# Patient Record
Sex: MALE | Race: WHITE
[De-identification: names, ages, dates, MRNs, and addresses within clinical notes are randomized per-mention and may not be internally consistent; named-entity substitution may affect disease eponyms.]

---

## 2017-08-05 ENCOUNTER — HOSPITAL ENCOUNTER (EMERGENCY)
Dept: HOSPITAL 25 - UCCORT | Age: 77
Discharge: HOME | End: 2017-08-05
Payer: MEDICARE

## 2017-08-05 VITALS — SYSTOLIC BLOOD PRESSURE: 124 MMHG | DIASTOLIC BLOOD PRESSURE: 55 MMHG

## 2017-08-05 DIAGNOSIS — Z87.891: ICD-10-CM

## 2017-08-05 DIAGNOSIS — I10: ICD-10-CM

## 2017-08-05 DIAGNOSIS — J06.9: Primary | ICD-10-CM

## 2017-08-05 DIAGNOSIS — H61.20: ICD-10-CM

## 2017-08-05 PROCEDURE — 99213 OFFICE O/P EST LOW 20 MIN: CPT

## 2017-08-05 PROCEDURE — G0463 HOSPITAL OUTPT CLINIC VISIT: HCPCS

## 2017-08-05 PROCEDURE — 87651 STREP A DNA AMP PROBE: CPT

## 2017-08-05 NOTE — UC
Throat Pain/Nasal Cy HPI





- HPI Summary


HPI Summary: 





3 days of Uri congestion cough and sore throat, was with granddaughter last 

week who had strep, pt has cough and sore throat





- History of Current Complaint


Chief Complaint: UCRespiratory


Stated Complaint: COUGH/CONGEST/SORE THROAT


Time Seen by Provider: 08/05/17 17:53


Hx Obtained From: Patient


Onset/Duration: Sudden Onset, Lasting Days - 3, Worse Since - today


Severity: Moderate


Pain Intensity: 3


Pain Scale Used: 0-10 Numeric


Cough: Nonproductive


Associated Signs & Symptoms: Positive: Wheezing - this morning.  Negative: FB 

Sensation, Drooling, Hoarseness, Sinus Discomfort, Nasal Discharge, Fever





- Allergies/Home Medications


Allergies/Adverse Reactions: 


 Allergies











Allergy/AdvReac Type Severity Reaction Status Date / Time


 


No Known Allergies Allergy   Verified 08/05/17 17:42











Home Medications: 


 Home Medications





2 Other Meds For Bp  DAILY 08/05/17 [History]


Omeprazole CAP* [Prilosec CAP* 20 MG] 20 mg PO DAILY 08/05/17 [History 

Confirmed 08/05/17]


Telmisartan 40 mg PO DAILY 08/05/17 [History Confirmed 08/05/17]











PMH/Surg Hx/FS Hx/Imm Hx


Previously Healthy: No


Cardiovascular History: Hypertension





- Surgical History


Surgical History: Yes


Surgery Procedure, Year, and Place: DETATCHED RETINA WITH SCLERAL BUCKLE  (

RETINA VITRIOUS SPECIALISTIS IN [place] DR ZELAYA//OR REPORT SCANNED INTO 

[email or web address] FACILITIES REPORTS),lower back





- Family History


Known Family History: Positive: None





- Social History


Occupation: Retired


Lives: With Family


Alcohol Use: Daily


Alcohol Amount: beer daily


Substance Use Type: None


Smoking Status (MU): Former Smoker


Type: Cigarettes


Amount Used/How Often: can not recall


Length of Time of Smoking/Using Tobacco: can not recall





Review of Systems


Constitutional: Negative


Skin: Negative


Eyes: Negative


ENT: Sore Throat


Respiratory: Cough


Cardiovascular: Negative


Gastrointestinal: Negative


Genitourinary: Negative


Motor: Negative


Neurovascular: Negative


Musculoskeletal: Negative


Neurological: Negative


Psychological: Negative


All Other Systems Reviewed And Are Negative: Yes





Physical Exam


Triage Information Reviewed: Yes


Appearance: Well-Appearing, No Pain Distress, Well-Nourished


Vital Signs: 


 Initial Vital Signs











Temp  99.5 F   08/05/17 17:35


 


Pulse  87   08/05/17 17:35


 


Resp  16   08/05/17 17:35


 


BP  124/55   08/05/17 17:35


 


Pulse Ox  99   08/05/17 17:35











Vital Signs Reviewed: Yes


Eye Exam: Normal


Eyes: Positive: Conjunctiva Clear


ENT Exam: Normal


ENT: Positive: Normal ENT inspection, Hearing grossly normal, Other: - cerumen 

impaction---WNL after ear irragation.  Negative: Nasal congestion, Nasal 

drainage, Tonsillar swelling, Tonsillar exudate, Trismus, Muffled/hoarse voice


Dental Exam: Normal


Neck exam: Normal


Neck: Positive: Supple, Nontender, No Lymphadenopathy


Respiratory Exam: Normal


Respiratory: Positive: Chest non-tender, Lungs clear, Normal breath sounds, No 

respiratory distress, No accessory muscle use


Cardiovascular Exam: Normal


Cardiovascular: Positive: RRR, No Murmur, Pulses Normal, Brisk Capillary Refill


Musculoskeletal Exam: Normal


Musculoskeletal: Positive: Strength Intact, ROM Intact, No Edema


Neurological Exam: Normal


Neurological: Positive: Alert, Muscle Tone Normal


Psychological Exam: Normal


Skin Exam: Normal





Diagnostics





- Laboratory


Diagnostic Studies Completed/Ordered: RST (-)





Throat Pain/Nasal Course/Dx





- Course


Course Of Treatment: Robitussin, Ibuprofen, tylenol, increase fluids, follow 

with pcp prn





- Differential Dx/Diagnosis


Differential Diagnosis/HQI/PQRI: Influenza, Otitis Media, Pharyngitis, Sinusitis

, URI


Provider Diagnoses: Uri, cerumen impaction





Discharge





- Discharge Plan


Condition: Stable


Disposition: HOME


Patient Education Materials:  Antitussives (By mouth), Cerumen Impaction (ED), 

Viral Syndrome (ED)


Referrals: 


Bobby Smith MD [Primary Care Provider] - 1 Week

## 2018-08-19 ENCOUNTER — HOSPITAL ENCOUNTER (EMERGENCY)
Dept: HOSPITAL 25 - UCCORT | Age: 78
Discharge: HOME | End: 2018-08-19
Payer: MEDICARE

## 2018-08-19 VITALS — SYSTOLIC BLOOD PRESSURE: 99 MMHG | DIASTOLIC BLOOD PRESSURE: 50 MMHG

## 2018-08-19 DIAGNOSIS — R50.9: Primary | ICD-10-CM

## 2018-08-19 DIAGNOSIS — Z87.891: ICD-10-CM

## 2018-08-19 PROCEDURE — 99212 OFFICE O/P EST SF 10 MIN: CPT

## 2018-08-19 PROCEDURE — G0463 HOSPITAL OUTPT CLINIC VISIT: HCPCS

## 2018-08-19 PROCEDURE — 71046 X-RAY EXAM CHEST 2 VIEWS: CPT

## 2018-08-19 NOTE — RAD
HISTORY: fever, exposure to pneumonia



COMPARISONS: None



VIEWS: 4: Frontal dual-energy and lateral views of the chest.



FINDINGS:

CARDIOMEDIASTINAL SILHOUETTE: The cardiomediastinal silhouette is normal.

TANIA: The tania are normal.

PLEURA: There is blunting of left costophrenic angle.

LUNG PARENCHYMA: The lungs are clear.

ABDOMEN: The upper abdomen is clear. There is no subphrenic gas.

BONES AND SOFT TISSUES: Degenerative changes are noted along the spine.

OTHER: None.



IMPRESSION:

SMALL LEFT PLEURAL EFFUSION NO ACTIVE CARDIOPULMONARY DISEASE.

## 2018-08-19 NOTE — UC
HPI Febrile Illness





- HPI Summary


HPI Summary: 





Started getting fever 2 nights ago, while with wife at hospital she was 

diagnosed with pneumonia after being sick all week). Mild cough and maybe ST, 

but mostly just fever yesterday. Denies n/v/d, urinary symptoms, rashes, or 

headaches. Today feels very groggy and "out-of-it."





Recently traveled to [facility], denies sick contacts





- History of Current Complaint


Chief Complaint: UCGeneralIllness


Time Seen by Provider: 08/19/18 15:37


Hx Obtained From: Patient


Onset/Duration: Started Days Ago


Timing: Constant


Initial Severity: Moderate


Current Severity: Moderate


Aggravating Factors: Nothing


Alleviating Factors: Nothing


Associated Signs and Symptoms: Chills





- Allergy/Home Medications


Allergies/Adverse Reactions: 


 Allergies











Allergy/AdvReac Type Severity Reaction Status Date / Time


 


No Known Allergies Allergy   Verified 08/19/18 15:53











Home Medications: 


 Home Medications





Amlodipine Besylate [Norvasc 5 mg tab] 1 tab DAILY 08/19/18 [History Confirmed 

08/19/18]


Omeprazole CAP* [Prilosec CAP* 20 MG] 40 mg DAILY 08/19/18 [History Confirmed 08 /19/18]


Telmisartan 80 mg DAILY 08/19/18 [History Confirmed 08/19/18]


Terazosin CAP* [Hytrin CAP 5 MG*] 1 cap DAILY 08/19/18 [History Confirmed 08/19/ 18]











PMH/Surg Hx/FS Hx/Imm Hx


Cardiovascular History: Hypertension





- Surgical History


Surgical History: Yes


Surgery Procedure, Year, and Place: DETATCHED RETINA WITH SCLERAL BUCKLE  (

RETINA VITRIOUS SPECIALISTIS IN [place] DR ZELAYA//OR REPORT SCANNED INTO 

[facility]-OTHER FACILITIES REPORTS),lower back





- Family History


Known Family History: Positive: None





- Social History


Occupation: Retired


Lives: With Family


Alcohol Use: Daily


Alcohol Amount: beer daily


Substance Use Type: None


Smoking Status (MU): Former Smoker


Type: Cigarettes


Amount Used/How Often: can not recall


Length of Time of Smoking/Using Tobacco: can not recall





Review of Systems


Constitutional: Fever, Chills, Fatigue


Skin: Negative


Eyes: Negative


ENT: Negative


Respiratory: Cough


Cardiovascular: Negative


Gastrointestinal: Negative


Genitourinary: Negative


Motor: Negative


Neurovascular: Negative


Musculoskeletal: Negative


Neurological: Negative


Psychological: Negative


Is Patient Immunocompromised?: No


All Other Systems Reviewed And Are Negative: Yes





Physical Exam


Triage Information Reviewed: Yes


Appearance: Well-Appearing, No Pain Distress, Well-Nourished


Vital Signs Reviewed: Yes


Eye Exam: Normal


Eyes: Positive: Conjunctiva Clear


ENT Exam: Normal


ENT: Positive: Normal ENT inspection, Hearing grossly normal, Pharynx normal, 

TMs normal


Dental Exam: Normal


Neck exam: Normal


Neck: Positive: Supple, Nontender, No Lymphadenopathy


Respiratory: Positive: Chest non-tender, Normal breath sounds, No respiratory 

distress, No accessory muscle use, Rhonchi - a couple, clear with cough


Cardiovascular Exam: Normal


Cardiovascular: Positive: RRR, No Murmur


Musculoskeletal Exam: Normal


Musculoskeletal: Positive: Strength Intact


Neurological Exam: Normal


Neurological: Positive: Alert


Psychological Exam: Normal


Skin Exam: Normal





Course/Dx





- Diagnoses


Clinic Provider Diagnoses: fever





Discharge





- Sign-Out/Discharge


Documenting (check all that apply): Patient Departure





- Discharge Plan


Condition: Stable


Disposition: HOME


Prescriptions: 


Azithromycin TAB* [Zithromax TAB (Z-CARLO) 250 mg #6 tabs] 2 tab PO .TODAY, THEN 

1 DAILY #1 carlo


Patient Education Materials:  Fever in Adults (ED)


Referrals: 


Bobby Smith MD [Primary Care Provider] - 3 Days


Additional Instructions: 


As we discussed, I do not see any convincing evidence that you have pneumonia 

today. However, since your wife is currently sick, I have prescribed an 

antibiotic that you should only start if you get another fever over 100.5 in 

the next 1-2 days. 





Your blood pressure was a little low today. I encourage you to drink plenty of 

fluids and add some extra salty snacks.





Please follow up with your primary care provider for any lingering symptoms.





- Billing Disposition and Condition


Condition: STABLE


Disposition: Home

## 2020-02-12 ENCOUNTER — HOSPITAL ENCOUNTER (EMERGENCY)
Dept: HOSPITAL 25 - UCCORT | Age: 80
Discharge: HOME | End: 2020-02-12
Payer: MEDICARE

## 2020-02-12 VITALS — DIASTOLIC BLOOD PRESSURE: 57 MMHG | SYSTOLIC BLOOD PRESSURE: 136 MMHG

## 2020-02-12 DIAGNOSIS — H61.23: ICD-10-CM

## 2020-02-12 DIAGNOSIS — J06.9: Primary | ICD-10-CM

## 2020-02-12 DIAGNOSIS — Z87.891: ICD-10-CM

## 2020-02-12 PROCEDURE — 99213 OFFICE O/P EST LOW 20 MIN: CPT

## 2020-02-12 PROCEDURE — G0463 HOSPITAL OUTPT CLINIC VISIT: HCPCS

## 2022-09-29 PROBLEM — Z00.00 ENCOUNTER FOR PREVENTIVE HEALTH EXAMINATION: Status: ACTIVE | Noted: 2022-09-29

## 2023-01-11 ENCOUNTER — APPOINTMENT (OUTPATIENT)
Dept: MRI IMAGING | Facility: CLINIC | Age: 83
End: 2023-01-11
Payer: MEDICARE

## 2023-01-11 PROCEDURE — 72197 MRI PELVIS W/O & W/DYE: CPT | Mod: MH

## 2023-01-11 PROCEDURE — 76498P: CUSTOM | Mod: MH

## 2023-01-11 PROCEDURE — A9585: CPT

## 2023-02-08 ENCOUNTER — APPOINTMENT (OUTPATIENT)
Dept: UROLOGY | Facility: CLINIC | Age: 83
End: 2023-02-08

## 2023-03-20 ENCOUNTER — APPOINTMENT (OUTPATIENT)
Dept: CARDIOLOGY | Facility: CLINIC | Age: 83
End: 2023-03-20
Payer: MEDICARE

## 2023-03-27 ENCOUNTER — APPOINTMENT (OUTPATIENT)
Dept: CARDIOLOGY | Facility: CLINIC | Age: 83
End: 2023-03-27
Payer: MEDICARE

## 2023-03-27 ENCOUNTER — NON-APPOINTMENT (OUTPATIENT)
Age: 83
End: 2023-03-27

## 2023-03-27 VITALS
DIASTOLIC BLOOD PRESSURE: 74 MMHG | WEIGHT: 180 LBS | OXYGEN SATURATION: 100 % | BODY MASS INDEX: 25.77 KG/M2 | HEART RATE: 60 BPM | SYSTOLIC BLOOD PRESSURE: 149 MMHG | HEIGHT: 70 IN

## 2023-03-27 PROCEDURE — 93000 ELECTROCARDIOGRAM COMPLETE: CPT

## 2023-03-27 PROCEDURE — 99205 OFFICE O/P NEW HI 60 MIN: CPT

## 2023-04-06 ENCOUNTER — NON-APPOINTMENT (OUTPATIENT)
Age: 83
End: 2023-04-06

## 2023-04-08 LAB
ALBUMIN SERPL ELPH-MCNC: 4.3 G/DL
ALP BLD-CCNC: 89 U/L
ALT SERPL-CCNC: 14 U/L
ANION GAP SERPL CALC-SCNC: 12 MMOL/L
AST SERPL-CCNC: 16 U/L
BASOPHILS # BLD AUTO: 0.02 K/UL
BASOPHILS NFR BLD AUTO: 0.5 %
BILIRUB SERPL-MCNC: 0.3 MG/DL
BUN SERPL-MCNC: 27 MG/DL
CALCIUM SERPL-MCNC: 9.9 MG/DL
CHLORIDE SERPL-SCNC: 105 MMOL/L
CHOLEST SERPL-MCNC: 172 MG/DL
CO2 SERPL-SCNC: 20 MMOL/L
CREAT SERPL-MCNC: 1.99 MG/DL
EGFR: 32 ML/MIN/1.73M2
EOSINOPHIL # BLD AUTO: 0.14 K/UL
EOSINOPHIL NFR BLD AUTO: 3.8 %
ESTIMATED AVERAGE GLUCOSE: 111 MG/DL
GLUCOSE SERPL-MCNC: 103 MG/DL
HBA1C MFR BLD HPLC: 5.5 %
HCT VFR BLD CALC: 38.6 %
HDLC SERPL-MCNC: 47 MG/DL
HGB BLD-MCNC: 13.1 G/DL
IMM GRANULOCYTES NFR BLD AUTO: 0.8 %
LDLC SERPL CALC-MCNC: 110 MG/DL
LYMPHOCYTES # BLD AUTO: 0.87 K/UL
LYMPHOCYTES NFR BLD AUTO: 23.5 %
MAN DIFF?: NORMAL
MCHC RBC-ENTMCNC: 33.9 GM/DL
MCHC RBC-ENTMCNC: 34.7 PG
MCV RBC AUTO: 102.4 FL
MONOCYTES # BLD AUTO: 0.51 K/UL
MONOCYTES NFR BLD AUTO: 13.7 %
NEUTROPHILS # BLD AUTO: 2.14 K/UL
NEUTROPHILS NFR BLD AUTO: 57.7 %
NONHDLC SERPL-MCNC: 125 MG/DL
PLATELET # BLD AUTO: 152 K/UL
POTASSIUM SERPL-SCNC: 5.4 MMOL/L
PROT SERPL-MCNC: 6.1 G/DL
RBC # BLD: 3.77 M/UL
RBC # FLD: 12.3 %
SODIUM SERPL-SCNC: 136 MMOL/L
TRIGL SERPL-MCNC: 76 MG/DL
TSH SERPL-ACNC: 2.48 UIU/ML
WBC # FLD AUTO: 3.71 K/UL

## 2023-07-07 ENCOUNTER — APPOINTMENT (OUTPATIENT)
Dept: NEPHROLOGY | Facility: CLINIC | Age: 83
End: 2023-07-07
Payer: MEDICARE

## 2023-07-07 VITALS
HEIGHT: 70 IN | WEIGHT: 182.98 LBS | BODY MASS INDEX: 26.2 KG/M2 | TEMPERATURE: 97.3 F | DIASTOLIC BLOOD PRESSURE: 59 MMHG | HEART RATE: 68 BPM | SYSTOLIC BLOOD PRESSURE: 121 MMHG | OXYGEN SATURATION: 97 %

## 2023-07-07 DIAGNOSIS — K21.9 GASTRO-ESOPHAGEAL REFLUX DISEASE W/OUT ESOPHAGITIS: ICD-10-CM

## 2023-07-07 PROCEDURE — 99205 OFFICE O/P NEW HI 60 MIN: CPT

## 2023-07-07 NOTE — HISTORY OF PRESENT ILLNESS
[FreeTextEntry1] : Contacts:\par 	Dr. 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 Long is a 82 year old man with chronic kidney disease stage III, hypertension, GERD, and BPH, here for kidney evaluation and management.\par \par Mr. Long previously lived [facility] and only moved to [place] last year to be closer to medical care (his wife was ill for a time, though has since recovered). Access to prior records, therefore, is scant. That said, he is aware that his "kidney number" was a little elevated, but nothing beyond that.\par \par He is otherwise somewhat active and fully indepenent. He does pilates for an hour each week. Mr. Long notes having trouble walking, as he develops pain in his legs after walking a bit. He's had this for a couple of years. Pain is in his hips and down back of legs. He had leg pain years ago, attributed to spinal issues, for which he had surgery (about 4 years ago). Current pain feels different.\par \par Mr. Long has been treated for hypertension for several years. At its worst, his SBP was about 140-150, and never above 160. His current antihypertensive regimen appears stable for some time.\par \par He has been taking omeprazole for at least 10 years, and notes no reflux symptoms recently.\par \par No other complaints or symptoms at this time.\par \par -------------------------------------------------------------------------------\par Social History:\par 	Currently lives in [place] with wife (previously [place])\par 	Has 4 children and several grandchildren\par 	Retired; worked as  for various companies, including [employer] and [employer]\par 	Prior tobacco use, quit 15-20 years ago\par 	Drinks beer intermittently, sometimes up to 6 beers in an evening\par 	\par Family History:\par 	Father  age ~100\par 	Mother  age 90s, had hypertension and stroke\par 	No known history of renal disease, hematuria, proteinuria, ESRD, dialysis, transplant\par \par -------------------------------------------------------------------------------\par Allergies: (Some medication caused gum erosion, but cannot recall name)\par \par Medications:\par 	Telmisartan 80mg daily\par 	Amlodipine 5mg daily\par 	Metoprolol succinate 25mg daily\par 	Omeprazole 40mg daily\par \par -------------------------------------------------------------------------------\par Physical Exam:\par 	Gen: NAD, well-appearing\par 	HEENT: Supple neck\par 	Pulm: CTA\par 	CV: RRR\par 	Back: No spinal or CVA terndeness\par 	Abd: +BS, soft, nontender/nondistended\par 	UE: Warm, FROM, intact strength\par 	LE: Warm, FROM, intact strength; no edema\par 	Neuro: No focal deficits\par 	Psych: Normal affect\par 	Skin: Warm, dry\par 	\par -------------------------------------------------------------------------------\par Labs/Studies:\par \par 	2023\par \par 		136 | 105 | 27\par 		---------------------< 103 Ca 9.9\par 		5.4 |  20 | 1.99\par \par 		Albumin 4.3\par \par 	Kidney Trend\par 		2023 SCr 1.99 (eGFR 32)\par 		2022 SCr 2.13 (eGFR 30)\par 		2022 SCr 1.94 (eGFR 34)\par \par 	2023 Hgb 13.1\par 	2023 Lipid: chol 172, TG 76, HDL 47, \par 	2023 HbA1c 5.5

## 2023-07-07 NOTE — ASSESSMENT
[FreeTextEntry1] : Mr. Long is a 82 year old man with chronic kidney disease stage III, hypertension, GERD, and BPH, here for kidney evaluation and management.\par \par Mr. Long has CKD III of unknown duration or etiology. Somewhat reassuringly, it has been largely stable since August 2022. There are no urine studies available for proteinuria or albuminuria assessment. At this time, given the lack of data, concerning signs/symptoms, and the stability over ~8 months, would opt for a conservative approach to management, with a focus on RAASi/BP control primarily. With the next set of labs, we will round out the evaluation, including urine protein/creatinine and urine albumin/creatinine ratios as well as cystatin-C measurements.\par \par For the time being, continue current management. I would try to see if we can taper off his beta-blocker as doesn't seem to have an indication for it, but we will first obtain better home BP readings.\par \par CKD III\par - Continue RAASi\par - Check urine studies with next labs, with additional diagnostics and therapeutics pending results\par \par Hypertension\par - Continue current regimen of telmisartan/amlodipine/metoprolol\par - He pays quite a bit out of pocket, I suggested use of [facility] -- he will look into it and get back to me\par - Infographic and log paper provided to obtain home BP readings; instructions for 5-7 days BID to make determination about beta-blocker use\par \par GERD\par - Unclear need for ongoing omeprazole, ideally defer to PCP for management\par \par \par PLAN:\par - Continue current meds\par - Home BP monitoring for 5-7 days BID\par - Labs in next 1-2 months\par - Follow up in October 2023\par \par \par Discussed importance of healthful habits, including physical activity/exercise and improvements in diet.\par \par I have spent a total of 60 minutes in which >50% was spent in discussion with patient regarding chronic kidney disease, hypertension, diet (including counseling on salt intake).

## 2023-07-07 NOTE — CONSULT LETTER
[Dear  ___] : Dear  [unfilled], [Courtesy Letter:] : I had the pleasure of seeing your patient, [unfilled], in my office today. [Please see my note below.] : Please see my note below. [Sincerely,] : Sincerely, [FreeTextEntry3] : Davis Zambrano MD\par Nephrology\par

## 2023-07-11 DIAGNOSIS — N40.0 BENIGN PROSTATIC HYPERPLASIA WITHOUT LOWER URINARY TRACT SYMPMS: ICD-10-CM

## 2023-09-18 ENCOUNTER — NON-APPOINTMENT (OUTPATIENT)
Age: 83
End: 2023-09-18

## 2023-09-18 ENCOUNTER — APPOINTMENT (OUTPATIENT)
Dept: CARDIOLOGY | Facility: CLINIC | Age: 83
End: 2023-09-18
Payer: MEDICARE

## 2023-09-18 VITALS
OXYGEN SATURATION: 100 % | WEIGHT: 185 LBS | BODY MASS INDEX: 26.48 KG/M2 | DIASTOLIC BLOOD PRESSURE: 60 MMHG | RESPIRATION RATE: 16 BRPM | HEART RATE: 52 BPM | SYSTOLIC BLOOD PRESSURE: 128 MMHG | HEIGHT: 70 IN

## 2023-09-18 PROCEDURE — 99215 OFFICE O/P EST HI 40 MIN: CPT

## 2023-09-18 PROCEDURE — 93000 ELECTROCARDIOGRAM COMPLETE: CPT

## 2023-09-18 RX ORDER — METOPROLOL SUCCINATE 25 MG/1
25 TABLET, EXTENDED RELEASE ORAL
Qty: 90 | Refills: 3 | Status: DISCONTINUED | COMMUNITY
Start: 2023-07-07 | End: 2023-09-18

## 2023-11-27 ENCOUNTER — APPOINTMENT (OUTPATIENT)
Dept: CARDIOLOGY | Facility: CLINIC | Age: 83
End: 2023-11-27
Payer: MEDICARE

## 2023-11-27 ENCOUNTER — NON-APPOINTMENT (OUTPATIENT)
Age: 83
End: 2023-11-27

## 2023-11-27 VITALS
BODY MASS INDEX: 26.48 KG/M2 | HEART RATE: 85 BPM | SYSTOLIC BLOOD PRESSURE: 146 MMHG | OXYGEN SATURATION: 99 % | DIASTOLIC BLOOD PRESSURE: 60 MMHG | HEIGHT: 70 IN | WEIGHT: 185 LBS

## 2023-11-27 PROCEDURE — 93000 ELECTROCARDIOGRAM COMPLETE: CPT

## 2023-11-27 PROCEDURE — 99215 OFFICE O/P EST HI 40 MIN: CPT

## 2024-01-11 LAB
25(OH)D3 SERPL-MCNC: 27.6 NG/ML
ALBUMIN SERPL ELPH-MCNC: 4.4 G/DL
ANION GAP SERPL CALC-SCNC: 12 MMOL/L
APPEARANCE: CLEAR
BACTERIA: NEGATIVE /HPF
BILIRUBIN URINE: NEGATIVE
BLOOD URINE: NEGATIVE
BUN SERPL-MCNC: 23 MG/DL
CALCIUM SERPL-MCNC: 9.5 MG/DL
CALCIUM SERPL-MCNC: 9.5 MG/DL
CAST: 0 /LPF
CHLORIDE SERPL-SCNC: 103 MMOL/L
CHOLEST SERPL-MCNC: 191 MG/DL
CO2 SERPL-SCNC: 21 MMOL/L
COLOR: YELLOW
CREAT SERPL-MCNC: 2.01 MG/DL
CREAT SPEC-SCNC: 63 MG/DL
CREAT SPEC-SCNC: 63 MG/DL
CREAT/PROT UR: 0.1 RATIO
CYSTATIN C SERPL-MCNC: 1.94 MG/L
EGFR: 32 ML/MIN/1.73M2
EPITHELIAL CELLS: 0 /HPF
GFR/BSA.PRED SERPLBLD CYS-BASED-ARV: 29 ML/MIN/1.73M2
GLUCOSE QUALITATIVE U: NEGATIVE MG/DL
GLUCOSE SERPL-MCNC: 100 MG/DL
HDLC SERPL-MCNC: 54 MG/DL
KETONES URINE: NEGATIVE MG/DL
LDLC SERPL CALC-MCNC: 127 MG/DL
LEUKOCYTE ESTERASE URINE: NEGATIVE
MICROALBUMIN 24H UR DL<=1MG/L-MCNC: <1.2 MG/DL
MICROALBUMIN/CREAT 24H UR-RTO: NORMAL MG/G
MICROSCOPIC-UA: NORMAL
NITRITE URINE: NEGATIVE
NONHDLC SERPL-MCNC: 137 MG/DL
PARATHYROID HORMONE INTACT: 69 PG/ML
PH URINE: 6
PHOSPHATE SERPL-MCNC: 3.6 MG/DL
POTASSIUM SERPL-SCNC: 4.9 MMOL/L
PROT UR-MCNC: 5 MG/DL
PROTEIN URINE: NEGATIVE MG/DL
RED BLOOD CELLS URINE: 0 /HPF
SODIUM SERPL-SCNC: 136 MMOL/L
SPECIFIC GRAVITY URINE: 1.01
TRIGL SERPL-MCNC: 51 MG/DL
UROBILINOGEN URINE: 0.2 MG/DL
WHITE BLOOD CELLS URINE: 0 /HPF

## 2024-01-25 ENCOUNTER — APPOINTMENT (OUTPATIENT)
Dept: CARDIOLOGY | Facility: CLINIC | Age: 84
End: 2024-01-25
Payer: MEDICARE

## 2024-01-25 VITALS
BODY MASS INDEX: 25.83 KG/M2 | SYSTOLIC BLOOD PRESSURE: 160 MMHG | HEART RATE: 66 BPM | OXYGEN SATURATION: 98 % | DIASTOLIC BLOOD PRESSURE: 65 MMHG | WEIGHT: 180 LBS

## 2024-01-25 DIAGNOSIS — R06.09 OTHER FORMS OF DYSPNEA: ICD-10-CM

## 2024-01-25 PROCEDURE — G2211 COMPLEX E/M VISIT ADD ON: CPT

## 2024-01-25 PROCEDURE — 99215 OFFICE O/P EST HI 40 MIN: CPT

## 2024-01-25 PROCEDURE — 93306 TTE W/DOPPLER COMPLETE: CPT

## 2024-05-13 ENCOUNTER — APPOINTMENT (OUTPATIENT)
Dept: CARDIOLOGY | Facility: CLINIC | Age: 84
End: 2024-05-13
Payer: MEDICARE

## 2024-05-13 ENCOUNTER — NON-APPOINTMENT (OUTPATIENT)
Age: 84
End: 2024-05-13

## 2024-05-13 VITALS
BODY MASS INDEX: 26.83 KG/M2 | SYSTOLIC BLOOD PRESSURE: 142 MMHG | WEIGHT: 187 LBS | DIASTOLIC BLOOD PRESSURE: 72 MMHG | OXYGEN SATURATION: 99 % | HEART RATE: 79 BPM

## 2024-05-13 DIAGNOSIS — E78.5 HYPERLIPIDEMIA, UNSPECIFIED: ICD-10-CM

## 2024-05-13 PROCEDURE — G2211 COMPLEX E/M VISIT ADD ON: CPT

## 2024-05-13 PROCEDURE — 99215 OFFICE O/P EST HI 40 MIN: CPT

## 2024-05-13 PROCEDURE — 93000 ELECTROCARDIOGRAM COMPLETE: CPT

## 2024-05-24 ENCOUNTER — APPOINTMENT (OUTPATIENT)
Dept: NEPHROLOGY | Facility: CLINIC | Age: 84
End: 2024-05-24
Payer: MEDICARE

## 2024-05-24 VITALS
OXYGEN SATURATION: 99 % | DIASTOLIC BLOOD PRESSURE: 64 MMHG | WEIGHT: 183 LBS | HEIGHT: 70 IN | HEART RATE: 87 BPM | TEMPERATURE: 97.8 F | BODY MASS INDEX: 26.2 KG/M2 | SYSTOLIC BLOOD PRESSURE: 129 MMHG

## 2024-05-24 DIAGNOSIS — N18.30 CHRONIC KIDNEY DISEASE, STAGE 3 UNSPECIFIED: ICD-10-CM

## 2024-05-24 DIAGNOSIS — I10 ESSENTIAL (PRIMARY) HYPERTENSION: ICD-10-CM

## 2024-05-24 PROCEDURE — 99214 OFFICE O/P EST MOD 30 MIN: CPT

## 2024-05-24 RX ORDER — TELMISARTAN 80 MG/1
80 TABLET ORAL
Qty: 90 | Refills: 3 | Status: ACTIVE | COMMUNITY
Start: 2023-07-07 | End: 1900-01-01

## 2024-05-24 RX ORDER — AMLODIPINE BESYLATE 5 MG/1
5 TABLET ORAL
Qty: 90 | Refills: 3 | Status: ACTIVE | COMMUNITY
Start: 2023-07-07 | End: 1900-01-01

## 2024-05-24 RX ORDER — OMEPRAZOLE 40 MG/1
40 CAPSULE, DELAYED RELEASE ORAL
Qty: 90 | Refills: 3 | Status: ACTIVE | COMMUNITY
Start: 2023-07-07 | End: 1900-01-01

## 2024-05-24 RX ORDER — TAMSULOSIN HYDROCHLORIDE 0.4 MG/1
0.4 CAPSULE ORAL
Qty: 90 | Refills: 3 | Status: DISCONTINUED | COMMUNITY
Start: 2023-07-11 | End: 2024-05-24

## 2024-05-24 NOTE — HISTORY OF PRESENT ILLNESS
[FreeTextEntry1] : Contacts:  Dr. 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 Long is a 83 year old man with chronic kidney disease stage III, hypertension, GERD, and BPH, here for kidney evaluation and management.  Last saw Mr. Long in 2023. Doing well in the interim. He is now off metoprolol.  Notes that he is having occasional episodes of feeling not quite right associated with a feeling of soft heart rate (he has trouble feeling his pulse during those episodes).  No other complaints or symptoms at this time.  ------------------------------------------------------------------------------- Social History:  Currently lives in [place] with wife (previously lived [place])  Has 4 children and several grandchildren  Retired; worked as  for various companies, including [employer] and [employer]  Prior tobacco use, quit 15-20 years ago  Drinks beer intermittently, sometimes up to 6 beers in an evening  Family History:  Father  age ~100  Mother  age 90s, had hypertension and stroke  No known history of renal disease, hematuria, proteinuria, ESRD, dialysis, transplant  ------------------------------------------------------------------------------- Allergies: (Some medication caused gum erosion, but cannot recall name)  Medications:  Telmisartan 80mg daily  Amlodipine 5mg daily  Omeprazole 40mg daily  ------------------------------------------------------------------------------- Physical Exam:   129/65, 83  133/67, 79   Gen: NAD, well-appearing  HEENT: Supple neck  Pulm: CTA  CV: RRR  Back: No spinal or CVA terndeness  Abd: +BS, soft  UE: Warm, FROM, intact strength  LE: Warm, FROM, intact strength; no edema  Neuro: No focal deficits  Psych: Normal affect  Skin: Warm, dry  ------------------------------------------------------------------------------- Labs/Studies:   2024    132 | 96 | 23   -------------------< 77 Ca 9.7   5.1 | 20 | 1.90   Kidney Trend   2024 SCr 1.90 (eGFR 34)   2024 SCr 1.89 (eGFR 34)   2023 SCr 2.01 (eGFR 32)   2023 SCr 1.99 (eGFR 32)   2022 SCr 2.13 (eGFR 30)   2022 SCr 1.94 (eGFR 34)   ...   2017 SCr 1.78 (eGFR 39)   2023 UACR negative  2023 UPCR 0.1 g/g   2024 Hgb 13.8  2023 Lipid: chol 172, TG 76, HDL 47,   2023 HbA1c 5.5

## 2024-05-24 NOTE — ASSESSMENT
[FreeTextEntry1] :  Mr. Long is a 82 year old man with chronic kidney disease stage III, hypertension, GERD, and BPH, here for kidney evaluation and management.  Mr. Long has CKD III of unknown duration or etiology. It has been stable for some time and he has no proteinuria.   For his recent symptoms, I encouraged him to reach out to Dr. Schulz's office for further evaluation.   CKD III: Continue RAASi  Hypertension: Continue current regimen of telmisartan/amlodipine   PLAN: - Continue current meds - Follow up in 6-12 months or as needed   Discussed importance of healthful habits, including physical activity/exercise and improvements in diet.  I spent a total of 30 minutes on this encounter.

## 2024-11-05 ENCOUNTER — APPOINTMENT (OUTPATIENT)
Dept: CARDIOLOGY | Facility: CLINIC | Age: 84
End: 2024-11-05
Payer: MEDICARE

## 2024-11-05 ENCOUNTER — NON-APPOINTMENT (OUTPATIENT)
Age: 84
End: 2024-11-05

## 2024-11-05 VITALS
HEART RATE: 71 BPM | DIASTOLIC BLOOD PRESSURE: 68 MMHG | SYSTOLIC BLOOD PRESSURE: 110 MMHG | WEIGHT: 174 LBS | HEIGHT: 70 IN | BODY MASS INDEX: 24.91 KG/M2 | OXYGEN SATURATION: 99 %

## 2024-11-05 DIAGNOSIS — I10 ESSENTIAL (PRIMARY) HYPERTENSION: ICD-10-CM

## 2024-11-05 DIAGNOSIS — E78.5 HYPERLIPIDEMIA, UNSPECIFIED: ICD-10-CM

## 2024-11-05 PROCEDURE — 93000 ELECTROCARDIOGRAM COMPLETE: CPT

## 2024-11-05 PROCEDURE — G2211 COMPLEX E/M VISIT ADD ON: CPT

## 2024-11-05 PROCEDURE — 99214 OFFICE O/P EST MOD 30 MIN: CPT

## 2025-01-13 ENCOUNTER — APPOINTMENT (OUTPATIENT)
Dept: CARDIOLOGY | Facility: CLINIC | Age: 85
End: 2025-01-13
Payer: MEDICARE

## 2025-01-13 VITALS
BODY MASS INDEX: 24.68 KG/M2 | SYSTOLIC BLOOD PRESSURE: 120 MMHG | OXYGEN SATURATION: 100 % | DIASTOLIC BLOOD PRESSURE: 80 MMHG | WEIGHT: 172 LBS | HEART RATE: 70 BPM

## 2025-01-13 DIAGNOSIS — E78.5 HYPERLIPIDEMIA, UNSPECIFIED: ICD-10-CM

## 2025-01-13 DIAGNOSIS — I10 ESSENTIAL (PRIMARY) HYPERTENSION: ICD-10-CM

## 2025-01-13 DIAGNOSIS — R06.09 OTHER FORMS OF DYSPNEA: ICD-10-CM

## 2025-01-13 PROCEDURE — G2211 COMPLEX E/M VISIT ADD ON: CPT

## 2025-01-13 PROCEDURE — 99214 OFFICE O/P EST MOD 30 MIN: CPT

## 2025-05-23 ENCOUNTER — APPOINTMENT (OUTPATIENT)
Dept: NEPHROLOGY | Facility: CLINIC | Age: 85
End: 2025-05-23

## 2025-05-28 ENCOUNTER — APPOINTMENT (OUTPATIENT)
Dept: CARDIOLOGY | Facility: CLINIC | Age: 85
End: 2025-05-28

## 2025-05-28 DIAGNOSIS — I10 ESSENTIAL (PRIMARY) HYPERTENSION: ICD-10-CM

## 2025-05-28 DIAGNOSIS — E78.5 HYPERLIPIDEMIA, UNSPECIFIED: ICD-10-CM
